# Patient Record
Sex: FEMALE | Race: WHITE | NOT HISPANIC OR LATINO | URBAN - METROPOLITAN AREA
[De-identification: names, ages, dates, MRNs, and addresses within clinical notes are randomized per-mention and may not be internally consistent; named-entity substitution may affect disease eponyms.]

---

## 2017-01-14 ENCOUNTER — EMERGENCY (EMERGENCY)
Facility: HOSPITAL | Age: 37
LOS: 1 days | Discharge: HOME | End: 2017-01-14
Attending: EMERGENCY MEDICINE | Admitting: EMERGENCY MEDICINE
Payer: COMMERCIAL

## 2017-01-14 VITALS
RESPIRATION RATE: 17 BRPM | SYSTOLIC BLOOD PRESSURE: 117 MMHG | HEART RATE: 105 BPM | DIASTOLIC BLOOD PRESSURE: 83 MMHG | OXYGEN SATURATION: 98 % | TEMPERATURE: 100 F

## 2017-01-14 DIAGNOSIS — Z90.49 ACQUIRED ABSENCE OF OTHER SPECIFIED PARTS OF DIGESTIVE TRACT: Chronic | ICD-10-CM

## 2017-01-14 LAB
ANION GAP SERPL CALC-SCNC: 14 MMOL/L — SIGNIFICANT CHANGE UP (ref 5–17)
APPEARANCE UR: CLEAR — SIGNIFICANT CHANGE UP
BILIRUB UR-MCNC: NEGATIVE — SIGNIFICANT CHANGE UP
BUN SERPL-MCNC: 10 MG/DL — SIGNIFICANT CHANGE UP (ref 7–23)
CALCIUM SERPL-MCNC: 9.2 MG/DL — SIGNIFICANT CHANGE UP (ref 8.4–10.5)
CHLORIDE SERPL-SCNC: 99 MMOL/L — SIGNIFICANT CHANGE UP (ref 96–108)
CO2 SERPL-SCNC: 26 MMOL/L — SIGNIFICANT CHANGE UP (ref 22–31)
COLOR SPEC: SIGNIFICANT CHANGE UP
CREAT SERPL-MCNC: 0.85 MG/DL — SIGNIFICANT CHANGE UP (ref 0.5–1.3)
DIFF PNL FLD: NEGATIVE — SIGNIFICANT CHANGE UP
FLUAV H1 2009 PAND RNA SPEC QL NAA+PROBE: DETECTED
GAS PNL BLDV: SIGNIFICANT CHANGE UP
GLUCOSE SERPL-MCNC: 100 MG/DL — HIGH (ref 70–99)
GLUCOSE UR QL: NEGATIVE — SIGNIFICANT CHANGE UP
HCG SERPL-ACNC: <2 MIU/ML — SIGNIFICANT CHANGE UP
HCT VFR BLD CALC: 42.1 % — SIGNIFICANT CHANGE UP (ref 34.5–45)
HGB BLD-MCNC: 14 G/DL — SIGNIFICANT CHANGE UP (ref 11.5–15.5)
KETONES UR-MCNC: NEGATIVE — SIGNIFICANT CHANGE UP
LEUKOCYTE ESTERASE UR-ACNC: NEGATIVE — SIGNIFICANT CHANGE UP
MCHC RBC-ENTMCNC: 28.3 PG — SIGNIFICANT CHANGE UP (ref 27–34)
MCHC RBC-ENTMCNC: 33.2 GM/DL — SIGNIFICANT CHANGE UP (ref 32–36)
MCV RBC AUTO: 85.4 FL — SIGNIFICANT CHANGE UP (ref 80–100)
NITRITE UR-MCNC: NEGATIVE — SIGNIFICANT CHANGE UP
PH UR: 6 — SIGNIFICANT CHANGE UP (ref 4.8–8)
PLATELET # BLD AUTO: 129 K/UL — LOW (ref 150–400)
POTASSIUM SERPL-MCNC: 4.3 MMOL/L — SIGNIFICANT CHANGE UP (ref 3.5–5.3)
POTASSIUM SERPL-SCNC: 4.3 MMOL/L — SIGNIFICANT CHANGE UP (ref 3.5–5.3)
PROT UR-MCNC: NEGATIVE — SIGNIFICANT CHANGE UP
RAPID RVP RESULT: DETECTED
RBC # BLD: 4.93 M/UL — SIGNIFICANT CHANGE UP (ref 3.8–5.2)
RBC # FLD: 11.8 % — SIGNIFICANT CHANGE UP (ref 10.3–14.5)
SODIUM SERPL-SCNC: 139 MMOL/L — SIGNIFICANT CHANGE UP (ref 135–145)
SP GR SPEC: 1.01 — LOW (ref 1.01–1.02)
UROBILINOGEN FLD QL: NEGATIVE — SIGNIFICANT CHANGE UP
WBC # BLD: 4.4 K/UL — SIGNIFICANT CHANGE UP (ref 3.8–10.5)
WBC # FLD AUTO: 4.4 K/UL — SIGNIFICANT CHANGE UP (ref 3.8–10.5)

## 2017-01-14 PROCEDURE — 84132 ASSAY OF SERUM POTASSIUM: CPT

## 2017-01-14 PROCEDURE — 93975 VASCULAR STUDY: CPT

## 2017-01-14 PROCEDURE — 71046 X-RAY EXAM CHEST 2 VIEWS: CPT

## 2017-01-14 PROCEDURE — 82947 ASSAY GLUCOSE BLOOD QUANT: CPT

## 2017-01-14 PROCEDURE — 99284 EMERGENCY DEPT VISIT MOD MDM: CPT | Mod: 25

## 2017-01-14 PROCEDURE — 84295 ASSAY OF SERUM SODIUM: CPT

## 2017-01-14 PROCEDURE — 71020: CPT | Mod: 26

## 2017-01-14 PROCEDURE — 82330 ASSAY OF CALCIUM: CPT

## 2017-01-14 PROCEDURE — 87581 M.PNEUMON DNA AMP PROBE: CPT

## 2017-01-14 PROCEDURE — 87486 CHLMYD PNEUM DNA AMP PROBE: CPT

## 2017-01-14 PROCEDURE — 83605 ASSAY OF LACTIC ACID: CPT

## 2017-01-14 PROCEDURE — 87633 RESP VIRUS 12-25 TARGETS: CPT

## 2017-01-14 PROCEDURE — 87798 DETECT AGENT NOS DNA AMP: CPT

## 2017-01-14 PROCEDURE — 81003 URINALYSIS AUTO W/O SCOPE: CPT

## 2017-01-14 PROCEDURE — 85027 COMPLETE CBC AUTOMATED: CPT

## 2017-01-14 PROCEDURE — 85014 HEMATOCRIT: CPT

## 2017-01-14 PROCEDURE — 80048 BASIC METABOLIC PNL TOTAL CA: CPT

## 2017-01-14 PROCEDURE — 99285 EMERGENCY DEPT VISIT HI MDM: CPT

## 2017-01-14 PROCEDURE — 76830 TRANSVAGINAL US NON-OB: CPT

## 2017-01-14 PROCEDURE — 76856 US EXAM PELVIC COMPLETE: CPT

## 2017-01-14 PROCEDURE — 82435 ASSAY OF BLOOD CHLORIDE: CPT

## 2017-01-14 PROCEDURE — 82803 BLOOD GASES ANY COMBINATION: CPT

## 2017-01-14 PROCEDURE — 84702 CHORIONIC GONADOTROPIN TEST: CPT

## 2017-01-14 RX ORDER — HYDROXYCHLOROQUINE SULFATE 200 MG
1 TABLET ORAL
Qty: 0 | Refills: 0 | COMMUNITY

## 2017-01-14 RX ORDER — ASPIRIN/CALCIUM CARB/MAGNESIUM 324 MG
0 TABLET ORAL
Qty: 0 | Refills: 0 | COMMUNITY

## 2017-01-14 NOTE — ED PROVIDER NOTE - ATTENDING CONTRIBUTION TO CARE
I have seen and evaluated this patient with the resident.   I agree with the findings  unless other wise stated.  I have made appropriate changes in documentations where needed, After my face to face bedside evaluation, I am further  notinF with negative Upreg. Will send basic labs, serum preg, CXR, transvaginal u/s given risk factor of fertility drugs & possible CT if transvaginal negative & serum preg negative.RVP +ve for

## 2017-01-14 NOTE — ED PROVIDER NOTE - MEDICAL DECISION MAKING DETAILS
Jenae Leija, DO: 36F with negative Upreg. Will send basic labs, serum preg, CXR, transvaginal u/s given risk factor of fertility drugs & possible CT if transvaginal negative & serum preg negative.

## 2017-01-14 NOTE — ED ADULT NURSE NOTE - OBJECTIVE STATEMENT
36 y.o female aaox3 ambulatory with h/o SLE, p/w c/o fever, cough headache and back pain x5 days, was treated with Azithromycin for 4 days, last dose taken yesterday. As per pt she feels the same, fever on and off with coughing and headache. Denies any SOB or chest pain, no nausea or vomiting or abdominal pain.

## 2017-01-14 NOTE — ED PROVIDER NOTE - CONSTITUTIONAL NEGATIVE STATEMENT, MLM
+fevers/chills, cough. No change in vision, no chest pain, no SOB, no cough, no abdominal pain, no nausea, no vomiting, no joint pain, no rashes, no focal neurologic complaints, no psychiatric issues, otherwise as HPI.

## 2017-01-14 NOTE — ED PROVIDER NOTE - PHYSICAL EXAMINATION
Jenae Leija, DO: PE: CONSTITUTIONAL: Well nourished, ill appearing but non-toxic. ENMT: Airway patent, nasal mucosa clear, mouth with normal mucosa, no pharyngeal erythema or exudate HEAD: NCAT EYES: PERRL, EOMI CARDIAC: RRR, no m/r/g, no pedal edema RESPIRATORY: CTA b/l, no adventitious sounds GI: Abdomen soft, exquisitely TTP in RLQ, non-peritoneal, no CVAT : external genitalia normal, no CMT or adnexal tenderness, no vaginal discharge, no vaginal discharge  NEURO: moving all extremities, gait intact SKIN: No rash

## 2017-01-14 NOTE — ED PROVIDER NOTE - OBJECTIVE STATEMENT
Jenae Leija, DO: 36F with hx of Lupus On Plaquenil & ASA p/w fever x 5 days and NP cough. Son sick at home. Denies n/v, CP, SOB, no sore throat, no odynophagia, abdominal pain, diarrhea, abdominal pain, myalgias, no neck pain, confuses. Endorses b/l flank pain but no dysuria, hematuria. No vaginal bleeding, vaginal discharge. LMP within last month. Pt on fertility drugs with 2 children at home.  PMD: Dr. Leonel Palmer

## 2017-01-15 VITALS
DIASTOLIC BLOOD PRESSURE: 71 MMHG | HEART RATE: 90 BPM | TEMPERATURE: 97 F | RESPIRATION RATE: 16 BRPM | OXYGEN SATURATION: 97 % | SYSTOLIC BLOOD PRESSURE: 110 MMHG

## 2017-01-15 PROCEDURE — 76856 US EXAM PELVIC COMPLETE: CPT | Mod: 26,59

## 2017-01-15 PROCEDURE — 76830 TRANSVAGINAL US NON-OB: CPT | Mod: 26

## 2017-01-15 PROCEDURE — 93975 VASCULAR STUDY: CPT | Mod: 26

## 2017-01-15 NOTE — ED ADULT NURSE REASSESSMENT NOTE - NS ED NURSE REASSESS COMMENT FT1
Patient remains stable, sent to sonogram secondary to tenderness in the left side of the abdomen during palpation. Positive for flu virus. Remains on mask. will continue to monitor.
Patient remeins stable, afebrile in the ED and states she's ready to go home. Sonogram results pending, will continue to monitor.

## 2017-01-18 DIAGNOSIS — J09.X2 INFLUENZA DUE TO IDENTIFIED NOVEL INFLUENZA A VIRUS WITH OTHER RESPIRATORY MANIFESTATIONS: ICD-10-CM

## 2017-01-18 DIAGNOSIS — R10.813 RIGHT LOWER QUADRANT ABDOMINAL TENDERNESS: ICD-10-CM

## 2017-01-18 DIAGNOSIS — Z90.49 ACQUIRED ABSENCE OF OTHER SPECIFIED PARTS OF DIGESTIVE TRACT: ICD-10-CM

## 2017-01-18 DIAGNOSIS — R50.9 FEVER, UNSPECIFIED: ICD-10-CM

## 2017-10-09 ENCOUNTER — RESULT REVIEW (OUTPATIENT)
Age: 37
End: 2017-10-09

## 2018-09-05 ENCOUNTER — RESULT REVIEW (OUTPATIENT)
Age: 38
End: 2018-09-05

## 2019-09-09 ENCOUNTER — RESULT REVIEW (OUTPATIENT)
Age: 39
End: 2019-09-09

## 2020-07-13 PROBLEM — M32.9 SYSTEMIC LUPUS ERYTHEMATOSUS, UNSPECIFIED: Chronic | Status: ACTIVE | Noted: 2017-01-14

## 2020-07-16 NOTE — ED PROVIDER NOTE - CHIEF COMPLAINT
Pt cut her finger on a razor  1 year ago , no sutures, finger 'looks weird , informed mother may be scar tissue , mother wants apt , apt made for 2pm  tomorrow  In the [facility]       COVID Pre-Visit Screening     1  Is this a family member screening? No  2  Have you traveled outside of your state in the past 2 weeks? No  3  Do you presently have a fever or flu-like symptoms? No  4  Do you have symptoms of an upper respiratory infection like runny nose, sore throat, or cough? No  5  Are you suffering from new headache that you have not had in the past?  No  6  Do you have/have you experienced any new shortness of breath recently? No  7  Do you have any new diarrhea, nausea or vomiting? No  8  Have you been in contact with anyone who has been sick or diagnosed with COVID-19? No  9  Do you have any new loss of taste or smell? No  10  Are you able to wear a mask without a valve for the entire visit?  Yes The patient is a 36y Female complaining of fever.

## 2020-07-31 ENCOUNTER — APPOINTMENT (OUTPATIENT)
Dept: OBGYN | Facility: CLINIC | Age: 40
End: 2020-07-31
Payer: COMMERCIAL

## 2020-07-31 VITALS
HEART RATE: 73 BPM | BODY MASS INDEX: 22.15 KG/M2 | DIASTOLIC BLOOD PRESSURE: 71 MMHG | SYSTOLIC BLOOD PRESSURE: 105 MMHG | HEIGHT: 63 IN | WEIGHT: 125 LBS

## 2020-07-31 DIAGNOSIS — Z30.09 ENCOUNTER FOR OTHER GENERAL COUNSELING AND ADVICE ON CONTRACEPTION: ICD-10-CM

## 2020-07-31 LAB
HCG UR QL: NEGATIVE
QUALITY CONTROL: YES

## 2020-07-31 PROCEDURE — 58300 INSERT INTRAUTERINE DEVICE: CPT

## 2020-07-31 PROCEDURE — 99202 OFFICE O/P NEW SF 15 MIN: CPT | Mod: 25

## 2020-07-31 PROCEDURE — 81025 URINE PREGNANCY TEST: CPT

## 2020-08-03 LAB
C TRACH RRNA SPEC QL NAA+PROBE: NOT DETECTED
N GONORRHOEA RRNA SPEC QL NAA+PROBE: NOT DETECTED
SOURCE AMPLIFICATION: NORMAL

## 2020-08-14 ENCOUNTER — APPOINTMENT (OUTPATIENT)
Dept: OBGYN | Facility: CLINIC | Age: 40
End: 2020-08-14
Payer: COMMERCIAL

## 2020-08-14 VITALS
BODY MASS INDEX: 22.15 KG/M2 | HEIGHT: 63 IN | DIASTOLIC BLOOD PRESSURE: 70 MMHG | HEART RATE: 85 BPM | SYSTOLIC BLOOD PRESSURE: 104 MMHG | WEIGHT: 125 LBS

## 2020-08-14 DIAGNOSIS — Z30.430 ENCOUNTER FOR INSERTION OF INTRAUTERINE CONTRACEPTIVE DEVICE: ICD-10-CM

## 2020-08-14 LAB
HCG UR QL: NEGATIVE
QUALITY CONTROL: YES

## 2020-08-14 PROCEDURE — 58300 INSERT INTRAUTERINE DEVICE: CPT

## 2020-08-14 PROCEDURE — 76830 TRANSVAGINAL US NON-OB: CPT

## 2020-08-14 PROCEDURE — 81025 URINE PREGNANCY TEST: CPT

## 2020-08-15 PROBLEM — Z30.430 ENCOUNTER FOR IUD INSERTION: Status: ACTIVE | Noted: 2020-07-31

## 2020-08-26 DIAGNOSIS — Z30.9 ENCOUNTER FOR CONTRACEPTIVE MANAGEMENT, UNSPECIFIED: ICD-10-CM

## 2020-08-28 RX ORDER — DROSPIRENONE AND ETHINYL ESTRADIOL 0.02-3(28)
3-0.02 KIT ORAL DAILY
Qty: 1 | Refills: 3 | Status: ACTIVE | COMMUNITY
Start: 2020-08-26 | End: 1900-01-01

## 2020-12-22 ENCOUNTER — RESULT REVIEW (OUTPATIENT)
Age: 40
End: 2020-12-22

## 2022-06-02 ENCOUNTER — APPOINTMENT (OUTPATIENT)
Dept: SURGERY | Facility: CLINIC | Age: 42
End: 2022-06-02

## 2025-04-10 ENCOUNTER — RESULT REVIEW (OUTPATIENT)
Age: 45
End: 2025-04-10